# Patient Record
Sex: FEMALE | Race: WHITE | NOT HISPANIC OR LATINO | Employment: OTHER | URBAN - METROPOLITAN AREA
[De-identification: names, ages, dates, MRNs, and addresses within clinical notes are randomized per-mention and may not be internally consistent; named-entity substitution may affect disease eponyms.]

---

## 2019-07-05 ENCOUNTER — HOSPITAL ENCOUNTER (EMERGENCY)
Facility: HOSPITAL | Age: 54
Discharge: LEFT BEFORE DISCHARGE | End: 2019-07-05
Attending: FAMILY MEDICINE
Payer: COMMERCIAL

## 2019-07-05 VITALS
HEART RATE: 60 BPM | OXYGEN SATURATION: 97 % | DIASTOLIC BLOOD PRESSURE: 90 MMHG | WEIGHT: 210 LBS | SYSTOLIC BLOOD PRESSURE: 140 MMHG | TEMPERATURE: 97.8 F | BODY MASS INDEX: 41.23 KG/M2 | HEIGHT: 60 IN | RESPIRATION RATE: 18 BRPM

## 2019-07-05 DIAGNOSIS — R53.1 GENERALIZED WEAKNESS: Primary | ICD-10-CM

## 2019-07-05 LAB
BASOPHILS # BLD AUTO: 0.1 THOUSANDS/ΜL (ref 0–0.1)
BASOPHILS NFR BLD AUTO: 1 % (ref 0–2)
EOSINOPHIL # BLD AUTO: 0.1 THOUSAND/ΜL (ref 0–0.61)
EOSINOPHIL NFR BLD AUTO: 1 % (ref 0–5)
ERYTHROCYTE [DISTWIDTH] IN BLOOD BY AUTOMATED COUNT: 14 % (ref 11.5–14.5)
HCT VFR BLD AUTO: 47.6 % (ref 42–47)
HGB BLD-MCNC: 16.2 G/DL (ref 12–16)
LYMPHOCYTES # BLD AUTO: 4.1 THOUSANDS/ΜL (ref 0.6–4.47)
LYMPHOCYTES NFR BLD AUTO: 37 % (ref 21–51)
MCH RBC QN AUTO: 33.2 PG (ref 26–34)
MCHC RBC AUTO-ENTMCNC: 34 G/DL (ref 31–37)
MCV RBC AUTO: 98 FL (ref 81–99)
MONOCYTES # BLD AUTO: 0.5 THOUSAND/ΜL (ref 0.17–1.22)
MONOCYTES NFR BLD AUTO: 4 % (ref 2–12)
NEUTROPHILS # BLD AUTO: 6.3 THOUSANDS/ΜL (ref 1.4–6.5)
NEUTS SEG NFR BLD AUTO: 57 % (ref 42–75)
PLATELET # BLD AUTO: 291 THOUSANDS/UL (ref 149–390)
PMV BLD AUTO: 7.9 FL (ref 8.6–11.7)
RBC # BLD AUTO: 4.87 MILLION/UL (ref 3.9–5.2)
WBC # BLD AUTO: 11 THOUSAND/UL (ref 4.8–10.8)

## 2019-07-05 PROCEDURE — 99285 EMERGENCY DEPT VISIT HI MDM: CPT

## 2019-07-05 PROCEDURE — 85025 COMPLETE CBC W/AUTO DIFF WBC: CPT | Performed by: FAMILY MEDICINE

## 2019-07-05 PROCEDURE — 36415 COLL VENOUS BLD VENIPUNCTURE: CPT | Performed by: FAMILY MEDICINE

## 2019-07-05 NOTE — ED PROVIDER NOTES
History  Chief Complaint   Patient presents with    Extremity Weakness     bilateral       History provided by:  Patient   used: No      This 50-year-old female presented to ED by EMS with complain of generalized weakness that started after she had a bowel movement and diligent stool  Patient states that she ate [employer] and started feeling abdominal cramps of when to use the bathroom had a bowel movement and started feeling weak  Patient states that she shot for help and they called EMS  Patient was given IV fluid and Zofran which had helped her symptoms  Currently awake alert oriented x3 deny any complaints this time  She states that she does have mild the abdominal cramps however denies any weakness at this time  Sitting comfortably in bed answering questions appropriately  She denies any history of similar episode in the past   Prior to Admission Medications   Prescriptions Last Dose Informant Patient Reported? Taking?   linaCLOtide (LINZESS) 145 MCG CAPS  Self Yes Yes   Sig: Take by mouth      Facility-Administered Medications: None       Past Medical History:   Diagnosis Date    Constipation     Diverticulitis        Past Surgical History:   Procedure Laterality Date    CHOLECYSTECTOMY         History reviewed  No pertinent family history  I have reviewed and agree with the history as documented  Social History     Tobacco Use    Smoking status: Current Every Day Smoker     Packs/day: 1 00    Smokeless tobacco: Never Used   Substance Use Topics    Alcohol use: Never     Frequency: Never    Drug use: Not on file        Review of Systems   Constitutional: Negative  HENT: Negative  Respiratory: Negative  Cardiovascular: Negative  Gastrointestinal: Positive for abdominal pain  Genitourinary: Negative  Musculoskeletal: Negative  Neurological: Negative  Psychiatric/Behavioral: Negative          Physical Exam  Physical Exam   Constitutional: She is oriented to person, place, and time  She appears well-developed and well-nourished  HENT:   Head: Normocephalic and atraumatic  Nose: Nose normal    Mouth/Throat: Oropharynx is clear and moist  No oropharyngeal exudate  Eyes: Pupils are equal, round, and reactive to light  Conjunctivae and EOM are normal  Right eye exhibits no discharge  Left eye exhibits no discharge  No scleral icterus  Neck: Normal range of motion  Neck supple  Cardiovascular: Normal rate and regular rhythm  Pulmonary/Chest: Effort normal and breath sounds normal  No respiratory distress  She has no wheezes  Abdominal: Soft  Bowel sounds are normal    Lymphadenopathy:     She has no cervical adenopathy  Neurological: She is alert and oriented to person, place, and time  Skin: Skin is warm and dry  Capillary refill takes less than 2 seconds  Psychiatric: She has a normal mood and affect  Her behavior is normal    Nursing note and vitals reviewed        Vital Signs  ED Triage Vitals [07/05/19 1943]   Temperature Pulse Respirations Blood Pressure SpO2   (!) 97 1 °F (36 2 °C) 55 18 138/95 98 %      Temp Source Heart Rate Source Patient Position - Orthostatic VS BP Location FiO2 (%)   Tympanic Monitor Lying Right arm --      Pain Score       2           Vitals:    07/05/19 1943   BP: 138/95   Pulse: 55   Patient Position - Orthostatic VS: Lying         Visual Acuity      ED Medications  Medications - No data to display    Diagnostic Studies  Results Reviewed     Procedure Component Value Units Date/Time    CBC and differential [[id number]]  (Abnormal) Collected:  07/05/19 2010    Lab Status:  Final result Specimen:  Blood from Arm, Left Updated:  07/05/19 2023     WBC 11 00 Thousand/uL      RBC 4 87 Million/uL      Hemoglobin 16 2 g/dL      Hematocrit 47 6 %      MCV 98 fL      MCH 33 2 pg      MCHC 34 0 g/dL      RDW 14 0 %      MPV 7 9 fL      Platelets 242 Thousands/uL      Neutrophils Relative 57 %      Lymphocytes Relative 37 % Monocytes Relative 4 %      Eosinophils Relative 1 %      Basophils Relative 1 %      Neutrophils Absolute 6 30 Thousands/µL      Lymphocytes Absolute 4 10 Thousands/µL      Monocytes Absolute 0 50 Thousand/µL      Eosinophils Absolute 0 10 Thousand/µL      Basophils Absolute 0 10 Thousands/µL     Basic metabolic panel [[id number]] Collected:  07/05/19 2010    Lab Status: In process Specimen:  Blood from Arm, Left Updated:  07/05/19 2017    Lipase [[id number]] Collected:  07/05/19 2010    Lab Status: In process Specimen:  Blood from Arm, Left Updated:  07/05/19 2017    UA w Reflex to Microscopic w Reflex to Culture [[id number]]     Lab Status:  No result Specimen:  Urine                  No orders to display              Procedures  Procedures       ED Course  ED Course as of Jul 05 2038   Jose G June Jul 05, 2019 2034 Patient is requesting to sign out AMA states that she is feeling much better at baseline I recommend the patient stay for further evaluation and treatment however patient refused  I told patient that leaving as medically advised that can worsen her condition including but not limited to death  MDM    Disposition  Final diagnoses:   Generalized weakness     Time reflects when diagnosis was documented in both MDM as applicable and the Disposition within this note     Time User Action Codes Description Comment    7/5/2019  8:36 PM Kiley Olson Add [R53 1] Generalized weakness       ED Disposition     ED Disposition Condition Date/Time Comment    [facility]  Fri Jul 5, 2019  8:36 PM Date: 7/5/2019  Patient: Lino Solares  Admitted: 7/5/2019  7:42 PM  Attending Provider: Nancy Loya MD    Lino Solares or her authorized caregiver has made the decision for the patient to leave the emergency department against the advice of her at tending physician  She or her authorized caregiver has been informed and understands the inherent risks, including death    She or her authorized caregiver has decided to accept the responsibility for this decision  Dinora Mo and all necessary par ties have been advised that she may return for further evaluation or treatment  Her condition at time of discharge was stable  Dinora Mo had current vital signs as follows:  /95 (BP Location: Right arm)   Pulse 55   Temp (!) 97 1 °F (36  2 °C) (Tympanic)   Resp 18   Ht 5' (1 524 m)   Wt 95 3 kg (210 lb)         Follow-up Information    None         Patient's Medications   Discharge Prescriptions    No medications on file     No discharge procedures on file      ED Provider  Electronically Signed by           Vee Casiano MD  07/05/19 2500

## 2019-07-06 NOTE — ED NOTES
Pt upset because her car is in "ten blocks away" at the [facility]  Pt states that she does not have the money to take a cab 10 blocks  I asked the supervisor if it was possible for security to run her up  It was not possible for security to leave the building  Mallissa Goodpasture, the supervisor then looked for any other hospital personal that could transport pt with no success  Pt became angry and left the ER, leaving her AMA/discharge paperwork with her        French Pang RN  07/05/19 6044